# Patient Record
Sex: FEMALE | Race: WHITE | Employment: OTHER | URBAN - METROPOLITAN AREA
[De-identification: names, ages, dates, MRNs, and addresses within clinical notes are randomized per-mention and may not be internally consistent; named-entity substitution may affect disease eponyms.]

---

## 2019-04-24 ENCOUNTER — APPOINTMENT (OUTPATIENT)
Dept: CT IMAGING | Age: 54
End: 2019-04-24
Attending: EMERGENCY MEDICINE
Payer: COMMERCIAL

## 2019-04-24 ENCOUNTER — HOSPITAL ENCOUNTER (EMERGENCY)
Age: 54
Discharge: HOME | End: 2019-04-24
Attending: EMERGENCY MEDICINE
Payer: COMMERCIAL

## 2019-04-24 VITALS
OXYGEN SATURATION: 99 % | BODY MASS INDEX: 31.24 KG/M2 | HEART RATE: 81 BPM | TEMPERATURE: 98.2 F | SYSTOLIC BLOOD PRESSURE: 140 MMHG | DIASTOLIC BLOOD PRESSURE: 86 MMHG | RESPIRATION RATE: 15 BRPM | WEIGHT: 169.75 LBS | HEIGHT: 62 IN

## 2019-04-24 DIAGNOSIS — R07.9 CHEST PAIN, UNSPECIFIED TYPE: Primary | ICD-10-CM

## 2019-04-24 LAB
ALBUMIN SERPL-MCNC: 4.1 G/DL (ref 3.5–5)
ALBUMIN/GLOB SERPL: 1.1 {RATIO} (ref 1.1–2.2)
ALP SERPL-CCNC: 71 U/L (ref 45–117)
ALT SERPL-CCNC: 26 U/L (ref 12–78)
ANION GAP SERPL CALC-SCNC: 14 MMOL/L (ref 5–15)
AST SERPL-CCNC: 20 U/L (ref 15–37)
ATRIAL RATE: 111 BPM
BASOPHILS # BLD: 0.1 K/UL (ref 0–0.1)
BASOPHILS NFR BLD: 1 % (ref 0–1)
BILIRUB SERPL-MCNC: 0.6 MG/DL (ref 0.2–1)
BUN SERPL-MCNC: 11 MG/DL (ref 6–20)
BUN/CREAT SERPL: 11 (ref 12–20)
CALCIUM SERPL-MCNC: 9.1 MG/DL (ref 8.5–10.1)
CALCULATED P AXIS, ECG09: 64 DEGREES
CALCULATED R AXIS, ECG10: 47 DEGREES
CALCULATED T AXIS, ECG11: 42 DEGREES
CHLORIDE SERPL-SCNC: 101 MMOL/L (ref 97–108)
CO2 SERPL-SCNC: 26 MMOL/L (ref 21–32)
COMMENT, HOLDF: NORMAL
CREAT SERPL-MCNC: 1 MG/DL (ref 0.55–1.02)
DIAGNOSIS, 93000: NORMAL
DIFFERENTIAL METHOD BLD: NORMAL
EOSINOPHIL # BLD: 0.1 K/UL (ref 0–0.4)
EOSINOPHIL NFR BLD: 1 % (ref 0–7)
ERYTHROCYTE [DISTWIDTH] IN BLOOD BY AUTOMATED COUNT: 13.2 % (ref 11.5–14.5)
GLOBULIN SER CALC-MCNC: 3.7 G/DL (ref 2–4)
GLUCOSE SERPL-MCNC: 110 MG/DL (ref 65–100)
HCT VFR BLD AUTO: 41.2 % (ref 35–47)
HGB BLD-MCNC: 13.9 G/DL (ref 11.5–16)
IMM GRANULOCYTES # BLD AUTO: 0 K/UL (ref 0–0.04)
IMM GRANULOCYTES NFR BLD AUTO: 0 % (ref 0–0.5)
LIPASE SERPL-CCNC: 212 U/L (ref 73–393)
LYMPHOCYTES # BLD: 1.4 K/UL (ref 0.8–3.5)
LYMPHOCYTES NFR BLD: 23 % (ref 12–49)
MCH RBC QN AUTO: 31.5 PG (ref 26–34)
MCHC RBC AUTO-ENTMCNC: 33.7 G/DL (ref 30–36.5)
MCV RBC AUTO: 93.4 FL (ref 80–99)
MONOCYTES # BLD: 0.4 K/UL (ref 0–1)
MONOCYTES NFR BLD: 7 % (ref 5–13)
NEUTS SEG # BLD: 4.1 K/UL (ref 1.8–8)
NEUTS SEG NFR BLD: 68 % (ref 32–75)
NRBC # BLD: 0 K/UL (ref 0–0.01)
NRBC BLD-RTO: 0 PER 100 WBC
P-R INTERVAL, ECG05: 162 MS
PLATELET # BLD AUTO: 285 K/UL (ref 150–400)
PMV BLD AUTO: 10.4 FL (ref 8.9–12.9)
POTASSIUM SERPL-SCNC: 3.6 MMOL/L (ref 3.5–5.1)
PROT SERPL-MCNC: 7.8 G/DL (ref 6.4–8.2)
Q-T INTERVAL, ECG07: 328 MS
QRS DURATION, ECG06: 72 MS
QTC CALCULATION (BEZET), ECG08: 446 MS
RBC # BLD AUTO: 4.41 M/UL (ref 3.8–5.2)
SAMPLES BEING HELD,HOLD: NORMAL
SODIUM SERPL-SCNC: 141 MMOL/L (ref 136–145)
TROPONIN I BLD-MCNC: <0.04 NG/ML (ref 0–0.08)
TROPONIN I SERPL-MCNC: <0.05 NG/ML
VENTRICULAR RATE, ECG03: 111 BPM
WBC # BLD AUTO: 6 K/UL (ref 3.6–11)

## 2019-04-24 PROCEDURE — 85025 COMPLETE CBC W/AUTO DIFF WBC: CPT

## 2019-04-24 PROCEDURE — 84484 ASSAY OF TROPONIN QUANT: CPT

## 2019-04-24 PROCEDURE — 74011250637 HC RX REV CODE- 250/637: Performed by: EMERGENCY MEDICINE

## 2019-04-24 PROCEDURE — 99285 EMERGENCY DEPT VISIT HI MDM: CPT

## 2019-04-24 PROCEDURE — 36415 COLL VENOUS BLD VENIPUNCTURE: CPT

## 2019-04-24 PROCEDURE — 71275 CT ANGIOGRAPHY CHEST: CPT

## 2019-04-24 PROCEDURE — 74011250636 HC RX REV CODE- 250/636: Performed by: EMERGENCY MEDICINE

## 2019-04-24 PROCEDURE — 74011636320 HC RX REV CODE- 636/320: Performed by: EMERGENCY MEDICINE

## 2019-04-24 PROCEDURE — 83690 ASSAY OF LIPASE: CPT

## 2019-04-24 PROCEDURE — 93005 ELECTROCARDIOGRAM TRACING: CPT

## 2019-04-24 PROCEDURE — 80053 COMPREHEN METABOLIC PANEL: CPT

## 2019-04-24 PROCEDURE — 96360 HYDRATION IV INFUSION INIT: CPT

## 2019-04-24 RX ORDER — SODIUM CHLORIDE 0.9 % (FLUSH) 0.9 %
10 SYRINGE (ML) INJECTION ONCE
Status: COMPLETED | OUTPATIENT
Start: 2019-04-24 | End: 2019-04-24

## 2019-04-24 RX ORDER — LORATADINE 10 MG/1
10 TABLET ORAL DAILY
COMMUNITY

## 2019-04-24 RX ORDER — HYDROXYZINE 25 MG/1
TABLET, FILM COATED ORAL
COMMUNITY

## 2019-04-24 RX ORDER — VENLAFAXINE HYDROCHLORIDE 150 MG/1
150 CAPSULE, EXTENDED RELEASE ORAL DAILY
COMMUNITY

## 2019-04-24 RX ORDER — CHOLECALCIFEROL (VITAMIN D3) 125 MCG
CAPSULE ORAL
COMMUNITY

## 2019-04-24 RX ORDER — NORETHINDRONE ACETATE AND ETHINYL ESTRADIOL .5; 2.5 MG/1; UG/1
1 TABLET ORAL DAILY
COMMUNITY

## 2019-04-24 RX ORDER — DIAZEPAM 2 MG/1
TABLET ORAL
COMMUNITY

## 2019-04-24 RX ORDER — TRAZODONE HYDROCHLORIDE 50 MG/1
TABLET ORAL
COMMUNITY

## 2019-04-24 RX ORDER — SODIUM CHLORIDE 9 MG/ML
50 INJECTION, SOLUTION INTRAVENOUS ONCE
Status: COMPLETED | OUTPATIENT
Start: 2019-04-24 | End: 2019-04-24

## 2019-04-24 RX ORDER — GUAIFENESIN 100 MG/5ML
325 LIQUID (ML) ORAL
Status: COMPLETED | OUTPATIENT
Start: 2019-04-24 | End: 2019-04-24

## 2019-04-24 RX ADMIN — Medication 10 ML: at 17:09

## 2019-04-24 RX ADMIN — ASPIRIN 81 MG CHEWABLE TABLET 324 MG: 81 TABLET CHEWABLE at 14:37

## 2019-04-24 RX ADMIN — IOPAMIDOL 80 ML: 755 INJECTION, SOLUTION INTRAVENOUS at 17:08

## 2019-04-24 RX ADMIN — SODIUM CHLORIDE 50 ML/HR: 900 INJECTION, SOLUTION INTRAVENOUS at 17:08

## 2019-04-24 RX ADMIN — SODIUM CHLORIDE 1000 ML: 900 INJECTION, SOLUTION INTRAVENOUS at 14:37

## 2019-04-24 NOTE — ED TRIAGE NOTES
Chest pain that started 20 min ago, radiating into \"left arm pit\" and left arm, numbness in left neck. Hx of migraines and fibromyalgia.

## 2019-04-24 NOTE — ED NOTES
Provided with d/c instructions, pt verbalized understanding. Ambulated out with steady gait, no changes to previous assessment.

## 2019-04-24 NOTE — DISCHARGE INSTRUCTIONS

## 2019-04-24 NOTE — ED PROVIDER NOTES
'chest pain x 1 hour/ worse today/ had last night at 11 but went away/ See's Dr Suarez/ last time 6 months ago'; I just decided to drive straight here';  
 
pt denies HA, vison changes, diff swallowing, SOB, Abd pain, F/Ch, N/V, D/Cons or other current systemic complaints Adds 'has not taken anything for the pain The history is provided by the patient and the spouse. Chest Pain (Angina) This is a new problem. The current episode started 1 to 2 hours ago. The problem has not changed since onset. The problem occurs constantly. The pain is associated with normal activity and stress. The pain is present in the substernal region. The pain is mild. The quality of the pain is described as pressure-like, vice-like, tightness and heavy. The pain radiates to the left neck and left shoulder. Associated symptoms include cough, exertional chest pressure, malaise/fatigue and shortness of breath. Pertinent negatives include no abdominal pain, no back pain, no claudication, no diaphoresis, no dizziness, no fever, no headaches, no hemoptysis, no irregular heartbeat, no leg pain, no lower extremity edema, no nausea, no near-syncope, no numbness, no orthopnea, no palpitations, no PND, no sputum production, no vomiting and no weakness. She has tried nothing for the symptoms. The treatment provided no relief. Risk factors include obesity, stress, a sendentary lifestyle and hypertension (fibromyalgia). Past workup comments: 'see's Dr Gross Manual'. Past Medical History:  
Diagnosis Date  Endocrine disease   
 hypothyroidism  Gastrointestinal disorder reflux  Hypertension  Other ill-defined conditions(886.80) hypothyroidism  Psychiatric disorder fibromyalgia  Psychiatric disorder depression Past Surgical History:  
Procedure Laterality Date  HX CHOLECYSTECTOMY  HX GYN    
 endometrial ablation, tubal ligation x 2 with reversal in between, , acdf  HX ORTHOPAEDIC    
 R shoulder surgery History reviewed. No pertinent family history. Social History Socioeconomic History  Marital status:  Spouse name: Not on file  Number of children: Not on file  Years of education: Not on file  Highest education level: Not on file Occupational History  Not on file Social Needs  Financial resource strain: Not on file  Food insecurity:  
  Worry: Not on file Inability: Not on file  Transportation needs:  
  Medical: Not on file Non-medical: Not on file Tobacco Use  Smoking status: Never Smoker  Smokeless tobacco: Current User Substance and Sexual Activity  Alcohol use: Yes Comment: occasional  
 Drug use: Never  Sexual activity: Not on file Lifestyle  Physical activity:  
  Days per week: Not on file Minutes per session: Not on file  Stress: Not on file Relationships  Social connections:  
  Talks on phone: Not on file Gets together: Not on file Attends [employer] service: Not on file Active member of club or organization: Not on file Attends meetings of clubs or organizations: Not on file Relationship status: Not on file  Intimate partner violence:  
  Fear of current or ex partner: Not on file Emotionally abused: Not on file Physically abused: Not on file Forced sexual activity: Not on file Other Topics Concern  Not on file Social History Narrative  Not on file ALLERGIES: Cephalosporins; Cinnamon; Compazine [prochlorperazine edisylate]; Elavil; Erythromycin; Gabapentin; Nortriptyline; Sulfa (sulfonamide antibiotics); Tigan [trimethobenzamide]; Ultram [tramadol]; and Zoloft [sertraline] Review of Systems Constitutional: Positive for malaise/fatigue. Negative for diaphoresis and fever. Respiratory: Positive for cough and shortness of breath. Negative for hemoptysis and sputum production. Cardiovascular: Positive for chest pain. Negative for palpitations, orthopnea, claudication, PND and near-syncope. Gastrointestinal: Negative for abdominal pain, nausea and vomiting. Musculoskeletal: Negative for back pain. Neurological: Negative for dizziness, weakness, numbness and headaches. All other systems reviewed and are negative. There were no vitals filed for this visit. Physical Exam  
Constitutional: She is oriented to person, place, and time. She appears well-developed and well-nourished. No distress. NAD, AxOx4, speaking in complete sentences 
gcs = 15 HENT:  
Head: Normocephalic and atraumatic. Mouth/Throat: Oropharynx is clear and moist. No oropharyngeal exudate. Cn intact No facial droop/ slurred speech/ tongue deviation Eyes: Pupils are equal, round, and reactive to light. Conjunctivae and EOM are normal. Right eye exhibits no discharge. Left eye exhibits no discharge. No scleral icterus. Neck: Normal range of motion. Neck supple. No JVD present. No tracheal deviation present. Cardiovascular: Normal rate, regular rhythm, normal heart sounds and intact distal pulses. Exam reveals no gallop and no friction rub. No murmur heard. Pulmonary/Chest: Effort normal and breath sounds normal. No stridor. No respiratory distress. She has no wheezes. She has no rales. She exhibits no tenderness. Abdominal: Soft. Bowel sounds are normal. She exhibits no distension and no mass. There is no tenderness. There is no rebound and no guarding. No hernia. nttp Genitourinary: No vaginal discharge found. Genitourinary Comments: Pt denies urinary/ vaginal complaints Musculoskeletal: Normal range of motion. She exhibits no edema, tenderness or deformity. Neurological: She is alert and oriented to person, place, and time. She displays normal reflexes. No cranial nerve deficit. She exhibits normal muscle tone.  Coordination normal.  
 pt has motor/ CV/ Sensation grossly intact to all extremities, R = L in strength;  
Skin: Skin is warm and dry. Capillary refill takes less than 2 seconds. No rash noted. No erythema. No pallor. Psychiatric: She has a normal mood and affect. Her behavior is normal. Thought content normal.  
Nursing note and vitals reviewed. MDM Procedures Chief Complaint Patient presents with  Chest Pain 2:12 PM 
The patients presenting problems have been discussed, and they are in agreement with the care plan formulated and outlined with them. I have encouraged them to ask questions as they arise throughout their visit. MEDICATIONS GIVEN: 
Medications - No data to display LABS REVIEWED: 
Labs Reviewed SAMPLES BEING HELD  
 
 
RADIOLOGY RESULTS: 
The following have been ordered and reviewed: 
_____________________________________________________________________ 
_____________________________________________________________________ EKG interpretation:  
Rhythm:  Sinus tachycardia rhythm; and regular . Rate (approx.): 110; Axis: normal; P wave: normal; QRS interval: normal ; ST/T wave: normal; Negative acute significant segmental elevations/ unchanged compared to study dated 01/01/2014 PROCEDURES: 
 
 
 
CONSULTATIONS:  
 
 
PROGRESS NOTES: 
 
DIAGNOSIS: 
 
1. Chest pain, unspecified type PLAN: 
1-Chest Pain / neg ed evaluation - will have pt follow-up with Cardiology;  
 
 
ED COURSE: The patients hospital course has been uncomplicated. 6:31 PM 
Jerome Freire's  results have been reviewed with her. She has been counseled regarding her diagnosis. She verbally conveys understanding and agreement of the signs, symptoms, diagnosis, treatment and prognosis and additionally agrees to Call/ Arrange follow up as recommended with Dr. Roseann Kee MD in 24 - 48 hours. She also agrees with the care-plan and conveys that all of her questions have been answered.   I have also put together some discharge instructions for her that include: 1) educational information regarding their diagnosis, 2) how to care for their diagnosis at home, as well a 3) list of reasons why they would want to return to the ED prior to their follow-up appointment, should their condition change or for concerns.

## 2019-04-24 NOTE — PROGRESS NOTES
Spiritual Care Assessment/Progress Note [facility] 
 
 
NAME: Deborah Beasley      MRN: [id number] AGE: 62 y.o. SEX: female Protestant Affiliation: No Mandaen Language: [place] 4/24/2019     Total Time (in minutes): 10 Spiritual Assessment begun in [facility] through conversation with: 
  
    [x]Patient        [x] Family    [] Friend(s) Reason for Consult: Emergency Department visit Spiritual beliefs: (Please include comment if needed) 
   [] Identifies with a luis angel tradition:     
   [] Supported by a luis angel community:        
   [] Claims no spiritual orientation:       
   [] Seeking spiritual identity:            
   [] Adheres to an individual form of spirituality:       
   [x] Not able to assess:                   
 
    
Identified resources for coping:  
   [] Prayer                           
   [] Music                  [] Guided Imagery [x] Family/friends                 [] Pet visits [] Devotional reading                         [] Unknown 
   [] Other:                                          
 
 
Interventions offered during this visit: (See comments for more details) Patient Interventions: Affirmation of emotions/emotional suffering, Normalization of emotional/spiritual concerns Family/Friend(s): Affirmation of emotions/emotional suffering, Normalization of emotional/spiritual concerns Plan of Care: 
 
 [] Support spiritual and/or cultural needs  
 [] Support AMD and/or advance care planning process    
 [] Support grieving process 
 [] Coordinate Rites and/or Rituals  
 [] Coordination with community clergy [] No spiritual needs identified at this time 
 [] Detailed Plan of Care below (See Comments)  [] Make referral to Music Therapy 
[] Make referral to Pet Therapy    
[] Make referral to Addiction services 
[] Make referral to [place] 
[] Make referral to Spiritual Care Partner 
[] No future visits requested [x] Follow up visits as needed Comments:  visit in ER. Pt was in bed and  at bedside. Appreciative of visit.  provided pastoral listening and support.  follow up as needed. Leonela Cardenas, [facility] 
 [phone])